# Patient Record
Sex: MALE | Race: WHITE | NOT HISPANIC OR LATINO
[De-identification: names, ages, dates, MRNs, and addresses within clinical notes are randomized per-mention and may not be internally consistent; named-entity substitution may affect disease eponyms.]

---

## 2019-05-10 PROBLEM — Z00.00 ENCOUNTER FOR PREVENTIVE HEALTH EXAMINATION: Status: ACTIVE | Noted: 2019-05-10

## 2019-05-20 ENCOUNTER — APPOINTMENT (OUTPATIENT)
Dept: DERMATOLOGY | Facility: CLINIC | Age: 69
End: 2019-05-20
Payer: COMMERCIAL

## 2019-05-20 VITALS — BODY MASS INDEX: 28.79 KG/M2 | WEIGHT: 190 LBS | HEIGHT: 68 IN

## 2019-05-20 DIAGNOSIS — D48.5 NEOPLASM OF UNCERTAIN BEHAVIOR OF SKIN: ICD-10-CM

## 2019-05-20 DIAGNOSIS — Z86.79 PERSONAL HISTORY OF OTHER DISEASES OF THE CIRCULATORY SYSTEM: ICD-10-CM

## 2019-05-20 DIAGNOSIS — Z78.9 OTHER SPECIFIED HEALTH STATUS: ICD-10-CM

## 2019-05-20 PROCEDURE — 11103 TANGNTL BX SKIN EA SEP/ADDL: CPT

## 2019-05-20 PROCEDURE — 99213 OFFICE O/P EST LOW 20 MIN: CPT | Mod: 25

## 2019-05-20 PROCEDURE — 11102 TANGNTL BX SKIN SINGLE LES: CPT

## 2019-05-20 RX ORDER — ALFUZOSIN HYDROCHLORIDE 10 MG/1
TABLET, EXTENDED RELEASE ORAL
Refills: 0 | Status: ACTIVE | COMMUNITY

## 2019-05-20 RX ORDER — FINASTERIDE 1 MG/1
TABLET ORAL
Refills: 0 | Status: ACTIVE | COMMUNITY

## 2019-05-20 RX ORDER — CLOPIDOGREL 75 MG/1
TABLET, FILM COATED ORAL
Refills: 0 | Status: ACTIVE | COMMUNITY

## 2019-05-20 RX ORDER — RANITIDINE 75 MG/1
75 TABLET ORAL
Refills: 0 | Status: ACTIVE | COMMUNITY

## 2019-05-20 RX ORDER — DILTIAZEM HYDROCHLORIDE 120 MG/1
120 TABLET, COATED ORAL
Refills: 0 | Status: ACTIVE | COMMUNITY

## 2019-05-20 RX ORDER — METOPROLOL TARTRATE AND HYDROCHLOROTHIAZIDE 50; 25 MG/1; MG/1
TABLET ORAL
Refills: 0 | Status: ACTIVE | COMMUNITY

## 2019-05-20 RX ORDER — CLINDAMYCIN PHOSPHATE 10 MG/ML
1 LOTION TOPICAL
Qty: 1 | Refills: 5 | Status: ACTIVE | COMMUNITY
Start: 2019-05-20 | End: 1900-01-01

## 2019-05-20 RX ORDER — LOSARTAN POTASSIUM 50 MG/1
50 TABLET, FILM COATED ORAL
Refills: 0 | Status: ACTIVE | COMMUNITY

## 2019-05-20 RX ORDER — KETOCONAZOLE FOAM 20 MG/G
2 AEROSOL, FOAM TOPICAL
Qty: 1 | Refills: 5 | Status: ACTIVE | COMMUNITY
Start: 2019-05-20 | End: 1900-01-01

## 2019-05-20 NOTE — ASSESSMENT
[FreeTextEntry1] : ? Actinic keratosis, rule out pigmented basal cell carcinoma on right temple\par ? Superficial basal cell carcinoma on left upper chest

## 2019-05-20 NOTE — PHYSICAL EXAM
[Alert] : alert [Oriented x 3] : ~L oriented x 3 [Well Nourished] : well nourished [FreeTextEntry3] : The following areas were examined and no significant abnormalities were seen except as noted below:\par \par Type II skin\par \par scalp, face, eyelids, nose, lips, ears, neck, chest, abdomen, back,groin, buttocks, right arm, left arm, right hand, left hand,\par right  leg, left leg, right foot, left foot\par \par Right temple: 6 x 4 mm thin, pink/brown scaly plaque\par Left upper chest: 12 x 9 mm, pink, scaly patch.\par Upper chest: Moderate small, brown, verrucous papules\par Trunk: Moderate 3-4 mm, brown macules and papules\par \par No other suspicious lesions seen

## 2019-05-20 NOTE — HISTORY OF PRESENT ILLNESS
[FreeTextEntry1] : Evaluation of growths [de-identified] : Followup visit for this 69-year-old white male for evaluation of growths. Particularly concerned about lesions on the face. No history of skin cancer.

## 2019-05-21 ENCOUNTER — TRANSCRIPTION ENCOUNTER (OUTPATIENT)
Age: 69
End: 2019-05-21

## 2019-05-29 ENCOUNTER — RESULT REVIEW (OUTPATIENT)
Age: 69
End: 2019-05-29

## 2019-06-03 ENCOUNTER — APPOINTMENT (OUTPATIENT)
Dept: DERMATOLOGY | Facility: CLINIC | Age: 69
End: 2019-06-03
Payer: COMMERCIAL

## 2019-06-03 DIAGNOSIS — C44.310 BASAL CELL CARCINOMA OF SKIN OF UNSPECIFIED PARTS OF FACE: ICD-10-CM

## 2019-06-03 DIAGNOSIS — Z85.828 PERSONAL HISTORY OF OTHER MALIGNANT NEOPLASM OF SKIN: ICD-10-CM

## 2019-06-03 DIAGNOSIS — C44.519 BASAL CELL CARCINOMA OF SKIN OF OTHER PART OF TRUNK: ICD-10-CM

## 2019-06-03 LAB — CORE LAB BIOPSY: NORMAL

## 2019-06-03 PROCEDURE — 17262 DSTRJ MAL LES T/A/L 1.1-2.0: CPT | Mod: 59

## 2019-06-03 PROCEDURE — 17281 DSTR MAL LS F/E/E/N/L/M .6-1: CPT

## 2019-06-03 NOTE — HISTORY OF PRESENT ILLNESS
[FreeTextEntry1] : Basal cell carcinomas [de-identified] : Followup visit for 69-year-old white male presenting for eradication of biopsy-proven 6 x 4 mm basal cell carcinoma on the right temple and a 12 x 9 mm superficial basal cell carcinoma on the left upper chest.

## 2019-06-03 NOTE — PHYSICAL EXAM
[Alert] : alert [Oriented x 3] : ~L oriented x 3 [Well Nourished] : well nourished [FreeTextEntry3] : Both wounds healing well without evidence of infection

## 2020-01-16 ENCOUNTER — APPOINTMENT (OUTPATIENT)
Dept: DERMATOLOGY | Facility: CLINIC | Age: 70
End: 2020-01-16
Payer: COMMERCIAL

## 2020-01-16 DIAGNOSIS — L91.0 HYPERTROPHIC SCAR: ICD-10-CM

## 2020-01-16 DIAGNOSIS — L81.4 OTHER MELANIN HYPERPIGMENTATION: ICD-10-CM

## 2020-01-16 PROCEDURE — 99213 OFFICE O/P EST LOW 20 MIN: CPT | Mod: 25

## 2020-01-16 PROCEDURE — 11900 INJECT SKIN LESIONS </W 7: CPT

## 2020-01-16 NOTE — PHYSICAL EXAM
[Alert] : alert [Oriented x 3] : ~L oriented x 3 [Well Nourished] : well nourished [FreeTextEntry3] : The following areas were examined and no significant abnormalities were seen except as noted below:\par \par Type II skin\par \par scalp, face, eyelids, nose, lips, ears, neck, chest, abdomen, back,groin, buttocks, right arm, left arm, right hand, left hand,\par right  leg, left leg, right foot, left foot\par \par Right temple: One well-healed without evidence of recurrence \par Left chest: 13 x 4 mm vertical elevated smooth pink plaque in center of a hypopigmented scar\par No evidence recurrence of the basal cell carcinoma\par Trunk: Mild to moderate 3-6 mm tan macules and dark brown macules with a few black verrucous plaques\par Scrotum: No rash seen\par Moderate 1-2 mm red/purple papule\par \par No suspicious lesions seen so

## 2020-01-16 NOTE — HISTORY OF PRESENT ILLNESS
[FreeTextEntry1] : Evaluation of growths and itching of scrotum [de-identified] : Followup visit for 6-year-old white male last seen by me on Deepika 3, 2019, at which time a basal cell carcinoma on the right temple and a superficial basal cell carcinoma on the left upper chest both eradicated by ED & C. Now complains of an enlarging itchy bump at the site on the left upper chest. Also presents for evaluation of growths.\par Patient also complains of itching of the scrotum. Treated with 2% ketoconazole foam and 1% clindamycin lotion without improvement.

## 2020-08-03 ENCOUNTER — APPOINTMENT (OUTPATIENT)
Dept: DERMATOLOGY | Facility: CLINIC | Age: 70
End: 2020-08-03
Payer: COMMERCIAL

## 2020-08-03 DIAGNOSIS — L29.9 PRURITUS, UNSPECIFIED: ICD-10-CM

## 2020-08-03 PROCEDURE — 99213 OFFICE O/P EST LOW 20 MIN: CPT

## 2020-08-03 NOTE — HISTORY OF PRESENT ILLNESS
[FreeTextEntry1] : Evaluation of growths [de-identified] : Followup visit for this 70-year-old white male last seen by me on January 16, 2020, evaluation of growths.  Particularly concerned about a lesion on the right lateral forehead.\par Patient has history of basal cell carcinomas treated in the past.\par \par He is also has a history of a pruritic hypertrophic scar from previous treatment of a superficial basal cell carcinoma on the left upper chest. This was treated with intralesional Kenalog 10 mg per cc at the last visit with improvement.\par \par Patient also complained of itching of the scrotum. Had previously been treated with 2% ketoconazole phone in 1% clindamycin lotion without improvement. At the last visit, he was started on 1% pimecrolimus cream with improvement.

## 2020-08-03 NOTE — PHYSICAL EXAM
[Alert] : alert [Oriented x 3] : ~L oriented x 3 [Well Nourished] : well nourished [FreeTextEntry3] : The following areas were examined and no significant abnormalities were seen except as noted below:\par \par Type II skin\par \par Patient is very tan\par \par scalp, face, eyelids, nose, lips, ears, neck, chest, abdomen, back,groin, buttocks, right arm, left arm, right hand, left hand,\par right  leg, left leg, right foot, left foot\par \par Right lateral forehead: Mild to moderate small and one large brown verrucous plaque\par Left upper chest: No evidence recurrence of the previously treated superficial basal cell carcinoma\par Mildly elevated hypertrophic scar persists\par Trunk:  Mild- moderate small brown verrucous plaque\par Back: Mild to moderate 2 mm dark brown macules\par Scrotum: Mild erythema with mild 2 mm purple papules\par \par No suspicious lesions seen\par \par No suspicious lesions seen

## 2020-08-03 NOTE — ASSESSMENT
[FreeTextEntry1] : Seborrheic keratoses on right lateral forehead\par Pruritus scrotum: Under good control with few angiokeratomas

## 2021-06-15 ENCOUNTER — APPOINTMENT (OUTPATIENT)
Dept: DERMATOLOGY | Facility: CLINIC | Age: 71
End: 2021-06-15
Payer: MEDICARE

## 2021-06-15 PROCEDURE — 17000 DESTRUCT PREMALG LESION: CPT

## 2021-06-15 PROCEDURE — 99213 OFFICE O/P EST LOW 20 MIN: CPT | Mod: 25

## 2021-06-15 PROCEDURE — 99072 ADDL SUPL MATRL&STAF TM PHE: CPT

## 2021-06-15 RX ORDER — HYDROCORTISONE 25 MG/G
2.5 CREAM TOPICAL
Qty: 1 | Refills: 2 | Status: ACTIVE | COMMUNITY
Start: 2021-06-15 | End: 1900-01-01

## 2021-06-15 NOTE — PHYSICAL EXAM
[Alert] : alert [Oriented x 3] : ~L oriented x 3 [Well Nourished] : well nourished [FreeTextEntry3] : The following areas were examined and no significant abnormalities were seen except as noted below:\par \par Type II skin\par \par scalp, face, eyelids, nose, lips, ears, neck, chest, abdomen, back,groin, buttocks, right arm, left arm, right hand, left hand,\par right  leg, left leg, right foot, left foot\par \par Patient is very tan\par \par Left lateral temple: 10 x 7 mm pink scaly patch with hyperpigmented periphery\par Trunk: Mild to moderate black verrucous papules and small plaques, especially upper chest\par A few on the lower lateral chest areas\par Posterior scrotum: Mild ill-defined erythema\par \par No other suspicious lesions seen

## 2021-06-15 NOTE — HISTORY OF PRESENT ILLNESS
[FreeTextEntry1] : Evaluation of growths and itching of scrotum [de-identified] : Followup visit for 71-year-old white male last seen by me on August 30, 2020, for evaluation of growths.  Patient has history of basal cell carcinoma treated in the past.  Particularly concerned about a lesion on the left temple.\par \par Patient also has a history of itching of the scrotum. Had previously been treated with 2% ketoconazole foam and 1% clindamycin lotion without improvement. He was subsequently treated with 1% pimecrolimus cream with improvement although patient states the itching has recurred.

## 2021-06-15 NOTE — ASSESSMENT
[FreeTextEntry1] : ? Actinic keratosis,?? Incipient basal cell carcinoma left lateral temple\par Seborrheic keratoses on trunk\par Pruritus of scrotum this? Subclinical eczema

## 2021-07-02 ENCOUNTER — APPOINTMENT (OUTPATIENT)
Dept: DERMATOLOGY | Facility: CLINIC | Age: 71
End: 2021-07-02
Payer: MEDICARE

## 2021-07-02 DIAGNOSIS — R21 RASH AND OTHER NONSPECIFIC SKIN ERUPTION: ICD-10-CM

## 2021-07-02 PROCEDURE — 99072 ADDL SUPL MATRL&STAF TM PHE: CPT

## 2021-07-02 PROCEDURE — 99213 OFFICE O/P EST LOW 20 MIN: CPT

## 2021-07-02 RX ORDER — BETAMETHASONE DIPROPIONATE 0.5 MG/G
0.05 CREAM, AUGMENTED TOPICAL
Qty: 1 | Refills: 1 | Status: ACTIVE | COMMUNITY
Start: 2021-07-02 | End: 1900-01-01

## 2021-07-02 RX ORDER — VALACYCLOVIR 1 G/1
1 TABLET, FILM COATED ORAL
Qty: 21 | Refills: 1 | Status: ACTIVE | COMMUNITY
Start: 2021-07-02 | End: 1900-01-01

## 2021-07-02 NOTE — PHYSICAL EXAM
[Alert] : alert [Oriented x 3] : ~L oriented x 3 [Well Nourished] : well nourished [FreeTextEntry3] : Right lower abdomen: Grouped ill-defined pink papules and curving linear streaks\par \par Left lateral temple: Lesion resolved with faint pink patch\par

## 2021-07-02 NOTE — ASSESSMENT
[FreeTextEntry1] : ? Incipient poison ivy (although rash is not itchy)\par Will cover for possible incipient herpes zoster

## 2021-07-02 NOTE — HISTORY OF PRESENT ILLNESS
[de-identified] : Followup visit for 71-year-old white male last seen by me on Deepika 15, 2021. with a four-day history of "irritating" rash on the right abdomen.  Self treated with 2-1/2% hydrocortisone cream. No previous episodes.\par Patient very concerned he might have "shingles".\par \par Patient also previously seen for pruritus of the scrotum. Treatment regimen was changed to 2-1/2% hydrocortisone cream 2-3 times a day as needed. Itching has improved although he still complains of "irritation".\par \par Patient also had a 10 x 7 mm pink scaly patch of hyperpigmented periphery on the left lateral temple. Differential diagnoses include actinic keratosis versus incipient basal cell carcinoma. This lesion was treated with cryosurgery at the last visit.

## 2021-08-23 ENCOUNTER — APPOINTMENT (OUTPATIENT)
Dept: OPHTHALMOLOGY | Facility: CLINIC | Age: 71
End: 2021-08-23
Payer: MEDICARE

## 2021-08-23 ENCOUNTER — NON-APPOINTMENT (OUTPATIENT)
Age: 71
End: 2021-08-23

## 2021-08-23 PROCEDURE — 92014 COMPRE OPH EXAM EST PT 1/>: CPT

## 2021-09-15 ENCOUNTER — RESULT REVIEW (OUTPATIENT)
Age: 71
End: 2021-09-15

## 2022-07-15 ENCOUNTER — NON-APPOINTMENT (OUTPATIENT)
Age: 72
End: 2022-07-15

## 2022-07-15 ENCOUNTER — APPOINTMENT (OUTPATIENT)
Dept: OPHTHALMOLOGY | Facility: CLINIC | Age: 72
End: 2022-07-15

## 2022-07-15 PROCEDURE — 92014 COMPRE OPH EXAM EST PT 1/>: CPT

## 2022-10-17 ENCOUNTER — APPOINTMENT (OUTPATIENT)
Dept: DERMATOLOGY | Facility: CLINIC | Age: 72
End: 2022-10-17

## 2022-10-17 DIAGNOSIS — L57.0 ACTINIC KERATOSIS: ICD-10-CM

## 2022-10-17 PROCEDURE — 99213 OFFICE O/P EST LOW 20 MIN: CPT

## 2022-10-17 NOTE — HISTORY OF PRESENT ILLNESS
[FreeTextEntry1] : Evaluation of growths [de-identified] : Use ketoconazoleFollow-up visit for 72-year-old white male last seen by me on July 2, 2021, for evaluation of growths.\par Patient has history of basal cell carcinoma treated in the past.\par \par Patient also has a history of pruritus of the scrotum.  Last treated with 2-1/2% hydrocortisone cream 2-3 times in the day as needed with partial improvement.\par Patient previously treated with 2% ketoconazole cream, 1% clindamycin lotion, without improvement.\par Was also treated with 1% pimecrolimus cream with temporary improvement.\par Patient also using betamethasone dipropionate augmented cream 0.05% episodically\par \par Itching is currently doing well.\par

## 2022-10-17 NOTE — PHYSICAL EXAM
[Alert] : alert [Oriented x 3] : ~L oriented x 3 [Well Nourished] : well nourished [FreeTextEntry3] : The following areas were examined and no significant abnormalities were seen except as noted below:\par \par Type II skin\par \par scalp, face, eyelids, nose, lips, ears, neck, chest, abdomen, back,groin, buttocks, right arm, left arm, right hand, left hand,\par right  leg, left leg, right foot, left foot\par \par Patient is very tanned\par \par Face: Rare pink scaly macules and small patches\par Trunk: Mild to moderate brown verrucous papules and small plaques, especially upper chest\par \par No other suspicious lesions seen

## 2022-10-18 ENCOUNTER — APPOINTMENT (OUTPATIENT)
Dept: DERMATOLOGY | Facility: CLINIC | Age: 72
End: 2022-10-18

## 2023-07-21 ENCOUNTER — NON-APPOINTMENT (OUTPATIENT)
Age: 73
End: 2023-07-21

## 2023-07-21 ENCOUNTER — APPOINTMENT (OUTPATIENT)
Dept: OPHTHALMOLOGY | Facility: CLINIC | Age: 73
End: 2023-07-21
Payer: MEDICARE

## 2023-07-21 PROCEDURE — 92014 COMPRE OPH EXAM EST PT 1/>: CPT

## 2024-01-09 ENCOUNTER — APPOINTMENT (OUTPATIENT)
Dept: DERMATOLOGY | Facility: CLINIC | Age: 74
End: 2024-01-09
Payer: MEDICARE

## 2024-01-09 DIAGNOSIS — L82.1 OTHER SEBORRHEIC KERATOSIS: ICD-10-CM

## 2024-01-09 DIAGNOSIS — D22.5 MELANOCYTIC NEVI OF TRUNK: ICD-10-CM

## 2024-01-09 DIAGNOSIS — L29.1 PRURITUS SCROTI: ICD-10-CM

## 2024-01-09 DIAGNOSIS — Z85.828 PERSONAL HISTORY OF OTHER MALIGNANT NEOPLASM OF SKIN: ICD-10-CM

## 2024-01-09 PROCEDURE — 99213 OFFICE O/P EST LOW 20 MIN: CPT

## 2024-01-09 RX ORDER — PIMECROLIMUS 10 MG/G
1 CREAM TOPICAL
Qty: 3 | Refills: 3 | Status: ACTIVE | COMMUNITY
Start: 2020-01-16 | End: 1900-01-01

## 2024-01-09 RX ORDER — KETOCONAZOLE 20 MG/G
2 CREAM TOPICAL
Qty: 3 | Refills: 3 | Status: ACTIVE | COMMUNITY
Start: 2024-01-09 | End: 1900-01-01

## 2024-08-28 ENCOUNTER — APPOINTMENT (OUTPATIENT)
Dept: DERMATOLOGY | Facility: CLINIC | Age: 74
End: 2024-08-28
Payer: MEDICARE

## 2024-08-28 DIAGNOSIS — L30.8 OTHER SPECIFIED DERMATITIS: ICD-10-CM

## 2024-08-28 DIAGNOSIS — D48.5 NEOPLASM OF UNCERTAIN BEHAVIOR OF SKIN: ICD-10-CM

## 2024-08-28 PROCEDURE — 99213 OFFICE O/P EST LOW 20 MIN: CPT | Mod: 25

## 2024-08-28 PROCEDURE — 11102 TANGNTL BX SKIN SINGLE LES: CPT

## 2024-08-28 RX ORDER — MOMETASONE FUROATE 1 MG/G
0.1 CREAM TOPICAL
Qty: 1 | Refills: 2 | Status: ACTIVE | COMMUNITY
Start: 2024-08-28 | End: 1900-01-01

## 2024-08-28 NOTE — PLAN
[TextEntry] : Mometasone cream 0.1% to scalp 2-3 times a day as needed Biopsy lesion on left lateral temple  Photo taken Verbal consent obtained  1% lidocaine with epinephrine Shave biopsy and curettage X 1 -lesion is sclerotic Monsels solution Vaseline  Wound care instructions given.  Patient to call in 2 weeks if not previously notified of results.  If biopsy is positive, refer for Mohs surgery  Return 6 months  [employer] medical assistant, served as chaperone and was present for the entire skin exam.

## 2024-08-28 NOTE — ASSESSMENT
[FreeTextEntry1] : Eczematous dermatitis of some type in the lower occipital scalp ?  Basal cell carcinoma on left lateral temple

## 2024-08-28 NOTE — PHYSICAL EXAM
[Alert] : alert [Oriented x 3] : ~L oriented x 3 [Well Nourished] : well nourished [FreeTextEntry3] : The following areas were examined and no significant abnormalities were seen except as noted below:  Type II skin  Patient is very tanned  scalp, face, eyelids, nose, lips, ears, neck, chest, abdomen, back,groin, buttocks, right arm, left arm, right hand, left hand, right  leg, left leg, right foot, left foot   Lower occipital scalp: Mild to moderate erythematous scaly patch Left lateral temple: 18 x 10 mm pink focally crusted plaque Trunk: Mild to moderate black verrucous papules and plaques  No other suspicious lesions seen

## 2024-08-28 NOTE — HISTORY OF PRESENT ILLNESS
[FreeTextEntry1] : Evaluation of growths [de-identified] : Follow-up visit for 74-year-old white male last seen by me on January 9, 2024, for evaluation of growths.  Patient has history of basal cell carcinoma treated in the past.  Patient has history of pruritus of the scrotum.  Currently treated with: 1% pimecrolimus cream which he uses sporadically as the problem has abated.  Patient also complains of itching of the lower occipital scalp area.  Self treated with 2% ketoconazole cream without improvement.

## 2024-09-12 ENCOUNTER — NON-APPOINTMENT (OUTPATIENT)
Age: 74
End: 2024-09-12

## 2024-10-31 ENCOUNTER — APPOINTMENT (OUTPATIENT)
Dept: OPHTHALMOLOGY | Facility: CLINIC | Age: 74
End: 2024-10-31
Payer: MEDICARE

## 2024-10-31 ENCOUNTER — NON-APPOINTMENT (OUTPATIENT)
Age: 74
End: 2024-10-31

## 2024-10-31 PROCEDURE — 92014 COMPRE OPH EXAM EST PT 1/>: CPT

## 2025-05-13 ENCOUNTER — APPOINTMENT (OUTPATIENT)
Dept: DERMATOLOGY | Facility: CLINIC | Age: 75
End: 2025-05-13
Payer: MEDICARE

## 2025-05-13 DIAGNOSIS — Z85.828 PERSONAL HISTORY OF OTHER MALIGNANT NEOPLASM OF SKIN: ICD-10-CM

## 2025-05-13 DIAGNOSIS — L82.1 OTHER SEBORRHEIC KERATOSIS: ICD-10-CM

## 2025-05-13 PROCEDURE — 99212 OFFICE O/P EST SF 10 MIN: CPT
